# Patient Record
Sex: FEMALE | Race: WHITE | URBAN - METROPOLITAN AREA
[De-identification: names, ages, dates, MRNs, and addresses within clinical notes are randomized per-mention and may not be internally consistent; named-entity substitution may affect disease eponyms.]

---

## 2025-04-23 ENCOUNTER — OFFICE VISIT (OUTPATIENT)
Dept: PAIN MEDICINE | Facility: CLINIC | Age: 75
End: 2025-04-23
Payer: MEDICARE

## 2025-04-23 VITALS
RESPIRATION RATE: 18 BRPM | DIASTOLIC BLOOD PRESSURE: 63 MMHG | SYSTOLIC BLOOD PRESSURE: 139 MMHG | TEMPERATURE: 98.4 F | WEIGHT: 148 LBS | HEART RATE: 63 BPM | BODY MASS INDEX: 23.23 KG/M2 | OXYGEN SATURATION: 95 % | HEIGHT: 67 IN

## 2025-04-23 DIAGNOSIS — R29.898 LEFT LEG WEAKNESS: ICD-10-CM

## 2025-04-23 DIAGNOSIS — M54.42 CHRONIC LEFT-SIDED LOW BACK PAIN WITH LEFT-SIDED SCIATICA: ICD-10-CM

## 2025-04-23 DIAGNOSIS — G89.29 CHRONIC LEFT-SIDED LOW BACK PAIN WITH LEFT-SIDED SCIATICA: ICD-10-CM

## 2025-04-23 DIAGNOSIS — M54.16 LUMBAR NEURITIS: Primary | ICD-10-CM

## 2025-04-23 PROCEDURE — 99215 OFFICE O/P EST HI 40 MIN: CPT | Performed by: ANESTHESIOLOGY

## 2025-04-23 RX ORDER — PAROXETINE HYDROCHLORIDE 20 MG/1
20 TABLET, FILM COATED ORAL EVERY MORNING
COMMUNITY

## 2025-04-23 RX ORDER — RALOXIFENE HYDROCHLORIDE 60 MG/1
60 TABLET, FILM COATED ORAL DAILY
COMMUNITY

## 2025-04-23 RX ORDER — GABAPENTIN 300 MG/1
300 CAPSULE ORAL NIGHTLY
Qty: 30 CAPSULE | Refills: 11 | Status: SHIPPED | OUTPATIENT
Start: 2025-04-23 | End: 2026-04-18

## 2025-04-23 SDOH — ECONOMIC STABILITY: FOOD INSECURITY: WITHIN THE PAST 12 MONTHS, THE FOOD YOU BOUGHT JUST DIDN'T LAST AND YOU DIDN'T HAVE MONEY TO GET MORE.: NEVER TRUE

## 2025-04-23 SDOH — ECONOMIC STABILITY: FOOD INSECURITY: WITHIN THE PAST 12 MONTHS, YOU WORRIED THAT YOUR FOOD WOULD RUN OUT BEFORE YOU GOT MONEY TO BUY MORE.: NEVER TRUE

## 2025-04-23 ASSESSMENT — PAIN DESCRIPTION - DESCRIPTORS: DESCRIPTORS: BURNING;DULL;THROBBING

## 2025-04-23 ASSESSMENT — PATIENT HEALTH QUESTIONNAIRE - PHQ9
2. FEELING DOWN, DEPRESSED OR HOPELESS: NOT AT ALL
1. LITTLE INTEREST OR PLEASURE IN DOING THINGS: NOT AT ALL
SUM OF ALL RESPONSES TO PHQ9 QUESTIONS 1 & 2: 0

## 2025-04-23 ASSESSMENT — PAIN - FUNCTIONAL ASSESSMENT: PAIN_FUNCTIONAL_ASSESSMENT: 0-10

## 2025-04-23 ASSESSMENT — ENCOUNTER SYMPTOMS
OCCASIONAL FEELINGS OF UNSTEADINESS: 1
LOSS OF SENSATION IN FEET: 1

## 2025-04-23 ASSESSMENT — COLUMBIA-SUICIDE SEVERITY RATING SCALE - C-SSRS
1. IN THE PAST MONTH, HAVE YOU WISHED YOU WERE DEAD OR WISHED YOU COULD GO TO SLEEP AND NOT WAKE UP?: NO
2. HAVE YOU ACTUALLY HAD ANY THOUGHTS OF KILLING YOURSELF?: NO
6. HAVE YOU EVER DONE ANYTHING, STARTED TO DO ANYTHING, OR PREPARED TO DO ANYTHING TO END YOUR LIFE?: NO

## 2025-04-23 ASSESSMENT — LIFESTYLE VARIABLES
SKIP TO QUESTIONS 9-10: 1
HOW OFTEN DO YOU HAVE A DRINK CONTAINING ALCOHOL: MONTHLY OR LESS
AUDIT-C TOTAL SCORE: 1
HOW OFTEN DO YOU HAVE SIX OR MORE DRINKS ON ONE OCCASION: NEVER
TOTAL SCORE: 1
HOW MANY STANDARD DRINKS CONTAINING ALCOHOL DO YOU HAVE ON A TYPICAL DAY: 1 OR 2

## 2025-04-23 ASSESSMENT — PAIN SCALES - GENERAL
PAINLEVEL_OUTOF10: 6
PAINLEVEL_OUTOF10: 6

## 2025-04-23 NOTE — PROGRESS NOTES
History Of Present Illness  Michell Mcneil is a 74 y.o. female presenting with   Chief Complaint   Patient presents with   • New Patient Visit   • Pain       Patient presents with complaints of chronic low back pain for 2 years that has gotten progressively worse. Her left foot feels like its on fire.     The pain is constant, worse with bending and better with rest. The pain is sharp, stabbing and shooting to the B/L LE. Denies LE paresthesias, weakness, saddle anesthesia, bowel or bladder incontinence. To manage this pain the patient has attempted Tylenol, Ibuprofen, NSAIDS. The patients chronic Panic disorder are stable on medication management.     PAIN SCORE: 7/10.    PCP; Dr. Padilla        Past Medical History  She has no past medical history on file.    Surgical History  She has a past surgical history that includes Ovarian cyst removal; Knee cartilage surgery (Left); and Varicose vein surgery (Right).     Social History  She reports that she has never smoked. She has never used smokeless tobacco. She reports current alcohol use. She reports that she does not currently use drugs.    Family History  Family History[1]     Allergies  Patient has no known allergies.    Review of Systems    All other systems reviewed and negative for any deficits. Pertinent positives and negatives were considered in the medical decision making process.        Physical Exam  /63   Pulse 63   Temp 36.9 °C (98.4 °F)   Resp 18   Wt 67.1 kg (148 lb)   SpO2 95%     General: Pt appears stated age    Eyes: Conjunctiva non-icteric and lids without obvious rash or drooping. Pupils are symmetric.    ENT: External ears and nose appear to be without deformity or rash. No lesions or masses noted. Hearing is grossly intact.    Respiratory: No gasping or shortness of breath noted. No use of accessory muscles noted.    CVS: Extremities show no edema or varicosities    Skin: No rashes or open lesions/ulcers identified on skin. No  "induration/tightening noted with palpation of skin.     Musculoskeletal: [place] is antalgic     Stability: No subluxation noted on movement of bilateral upper extremities or head/neck.     Strength: 5/5 in RLE and 3/5 in LLE     Range of Motion: WNL    Neurologic: Reflexes 2+    Sensation: DEC TO SHARP TOUCH IN HER LLE     Neurologic: Cranial Nerves II thru XII are grossly intact    Psychiatric: Pt is alert and oriented to time, person, and place           Assessment/Plan   1. Lumbar neuritis  MR lumbar spine wo IV contrast    Referral to Physical Therapy    gabapentin (Neurontin) 300 mg capsule      2. Left leg weakness  gabapentin (Neurontin) 300 mg capsule      3. Chronic left-sided low back pain with left-sided sciatica  gabapentin (Neurontin) 300 mg capsule           1. I have provided the patient with a list of physical therapy exercises to learn and perform to strengthen core, maintain stabilization, and reduce pain. We reviewed the exercises in detail and I encouraged them to perform them on a regular basis.    2. I would recommend the pt start on Gabapentin to help with nerve related pain. We discussed the risks, benefits, and side effects to this medication including the mechanism of action and the pt understands and agrees.    3. I extensively reviewed the patients SCOLIOSIS X-RAYS (2-) findings in detail, including review of the actual images and provided a detailed explanation of the findings using a spine model.     \"There is normal alignment of the cervical spine.  There is no scoliosis.    There is no evidence of acute spinal fracture or dislocation.  There are   multilevel degenerative changes with disc space narrowing, marginal   endplate osteophytes and facet joint hypertrophy.  There are degenerative   changes the bilateral sacroiliac and hip joints and pubic symphysis.    There is no evidence of acute process in the chest or abdomen.     No other significant abnormality. \"    4. The patient " is a candidate for an LESI L5/S1 to treat low back  and radicular pain. I spent time with the patient discussing the risks, benefits, and alternatives to this measure including, but not limited to worsening pain with injection, no improvement/guarantee with the procedure, numbness in the lower extremity and risk of falls post procedure, vagal reaction/ hypotension, feeling dizzy / lightheaded, spinal infection, worsening pre-existing infections, epidural hematoma, epidural abscess, nerve injury, paralysis, spinal fluid leak and spinal headache. The patient understands all of these risks and agrees to proceed with the planned procedure.     Pending an MRI.       I spent time with the patient reviewing their imaging and discussing the risks benefits and alternatives to the above plan. A total of 60 minutes was spent reviewing the data and greater than 50% of that time was with the patient during the face to face encounter discussing treatment options both surgical, non-surgical, and minimally invasive techniques. I also reviewed records from her previous pain physician Dr. Pelletier at [facility]         Sincerely,     Michael Dumont MD, BARRERAA  Board Certified Anesthesiologist  Board Certified Pain Management Specialist  Clinical Faculty, Department of Anesthesiology and Perioperative Medicine  [facility]     [address]  [facility] [address]  [address]  [facility]   [address] [address] [id number]    [facility]  [address] [id number]     Phone: [phone]  Fax: [phone]              [1]  No family history on file.

## 2025-04-23 NOTE — PROGRESS NOTES
Pt is a new visit and is complaining of pain in her low back that radiates down her left leg to her foot. Pt states that her pain level is a 6/10 on the pain scale. Pt denies back or neck surgery. Pt states she has hip and knee pain. Pt states she has a history of arthritis.

## 2025-05-01 ENCOUNTER — APPOINTMENT (OUTPATIENT)
Dept: RADIOLOGY | Facility: CLINIC | Age: 75
End: 2025-05-01
Payer: MEDICARE

## 2025-05-12 ENCOUNTER — EVALUATION (OUTPATIENT)
Dept: PHYSICAL THERAPY | Facility: CLINIC | Age: 75
End: 2025-05-12
Payer: MEDICARE

## 2025-05-12 DIAGNOSIS — M54.16 LUMBAR NEURITIS: ICD-10-CM

## 2025-05-12 PROCEDURE — 97110 THERAPEUTIC EXERCISES: CPT | Mod: GP

## 2025-05-12 PROCEDURE — 97161 PT EVAL LOW COMPLEX 20 MIN: CPT | Mod: GP

## 2025-05-12 ASSESSMENT — PATIENT HEALTH QUESTIONNAIRE - PHQ9
1. LITTLE INTEREST OR PLEASURE IN DOING THINGS: NOT AT ALL
SUM OF ALL RESPONSES TO PHQ9 QUESTIONS 1 AND 2: 0
2. FEELING DOWN, DEPRESSED OR HOPELESS: NOT AT ALL

## 2025-05-12 ASSESSMENT — COLUMBIA-SUICIDE SEVERITY RATING SCALE - C-SSRS
6. HAVE YOU EVER DONE ANYTHING, STARTED TO DO ANYTHING, OR PREPARED TO DO ANYTHING TO END YOUR LIFE?: NO
2. HAVE YOU ACTUALLY HAD ANY THOUGHTS OF KILLING YOURSELF?: NO
1. IN THE PAST MONTH, HAVE YOU WISHED YOU WERE DEAD OR WISHED YOU COULD GO TO SLEEP AND NOT WAKE UP?: NO

## 2025-05-12 ASSESSMENT — ENCOUNTER SYMPTOMS
DEPRESSION: 0
LOSS OF SENSATION IN FEET: 0
OCCASIONAL FEELINGS OF UNSTEADINESS: 0

## 2025-05-12 NOTE — PROGRESS NOTES
Patient Name Michell Mcneil  MRN: [id number]  Today's Date: 2025  Time Calculation  Start Time: 1345  Stop Time: 1432  Time Calculation (min): 47 min    Insurance:   Visit #: 1  Insurance Reviewed  Medicare part A and B  (per information provided by  pre-cert team)   Authorization required:  N  Approved # of visits: MN  Authorization/MCR Certification date range:  25-25    Therapy Diagnoses:   Problem List Items Addressed This Visit           ICD-10-CM    Lumbar neuritis M54.16    Relevant Orders    Follow Up In Physical Therapy       General:  Reason for visit: Low back pain   Referred by: Dr. Michael Dumont MD  Next MD appt:  2024  Preferred Name:  michell  Script:  Hari and TX  Onset Date:  25  Assessment/re-assessment dates:   Email/phone #:      Subjective:  HPI: 74 year old female had back pain since 2 years, she became worse and saw MD on 25.  Complains of pain across the back and radiating nerve pain, tingling and burning on the left leg to her foot.  Patient is on Gabapentin which has reduced her pain.  Patient is having a MRI 25    Pain- 5/10 average and today, 9/10 high pain  Type of pain:  LB throbbing and aching, leg electrical pain  What increases pain:  Bending, lifting, pushing  What decreases pain:  Gabapentin    Medical Hx/  Fall Risk:  Low  Steadi:  2 yes  Precautions: OA, Osteoporosis, DJD Left hip, Osteophytosis, Mild DJD    Human Trafficking risk:  No    Patient goal:  To relieve pain, and to improve activities.  Patient is aware of diagnosis and prognosis.    Living Environment:    Social Support:  lives with:   who is a last stages of [place]  DME:   None    Prior Function:   bicyling, walked, went to the gym, gardened    Function:    A and O x 3  Sleep:  Painful  ADL's: Painful  Chores: Difficulty  Driving: No difficulty  Work: Retired   Recreation: Unable  Sittin  Standing: 10 min    Walking: Not tried more than 10  "min    Objective:    Outcome Measures:  Other Measures  Other Outcome Measures: RAVI = 17/50     Posture:  moderately for flexed posture, Lateral shift to the right, decreased hip extension and trunk rotation  Gait/Stairs: moderately decreased trunk rotation and hip extension.    Palpation:  moderate tenderness B lumbar paraspinals and piriformis.      ROM:  Trunk Flexion:  3rd to floor- 6 inches  Extension: moderately limited  RSB:  3rd to fibular head- 2 inches  LSB:  3rd to fibular head- 2 inches  RR: Moderately limited  LR: Moderately limited    MMT:  Hip flexors on the left 4-/5, right 4+/5  Abd: 4-/5 on the left, 4+/5 on the right  Bridge/LE extensors: 4-/5 on the left, WNL on the right    Flexibility:  Hamstrings:  90/90:  R: -30  L: -30  Heelcords: WFL  Hip flexors: Positive bilaterally  Gluts: B. Positive  Piriformis: B. Positive   Balance: SLS:  R: 2 seconds  L: 1 second    5 x sit-stand:  19.91  normals:  60-69 years of age: 11.4 seconds  70 - 79 years of age: 12.6 seconds   80 - 89 years of age: 14. 8 seconds    Neuro:  (at re-assessment)    Treatment:    Evaluation:  30 minutes    **= HEP progression today NV= Next visit  np= not performed  nb= non-billable  G= group HEP= discharged to HEP    Therapeutic Exercise: 12  Nu-step(subjective taken/education):    NV    Standing hams and hip flexor/calf stretches: NV    Seated flexion stretch:  NV    LTR:  x 15/5\"**  B SKTC:  10/3\"**  B supine piriformis stretch:  3/30\"**    NMR:  5  T-band 3-way pull downs:  NV  T-band obliques: NV  T-band B pull for/back:  NV    Pelvic tilt:  10/5\"**  Bridges:  NV  PT hooklying with add set:  10**  PT hooklying with t-band abd:  NV     Self Care Home Management:    minutes  Education:  poc, anatomy, physiology, posture, body mechanics, safety awareness, HEP.  Preferred learning:  pictures, demonstration.  Demonstrated good understanding.                            Assessment:    Stable and/or uncomplicated " characteristics  ELevel of complexity:  Low  Michell Mcneil demonstrates symptoms consistent with exacerbation of chronic low back pain with mobility deficits.  Symptoms include low back, thigh pain with onset of symptoms.    Patient presents with flare up of OA in the LS, signs and symptoms are consistent with diagnosis and patient is an excellent candidate for Physical Therapy and needs work on/Skilled PT for ROM, flexibility, dynamic stabilization, strength, posture, body mechanics and gait/stairs for improved overall function.         Problems:    Pain:  _X__  Posture/Body mechanics deficits:  __X_  Decreased knowledge HEP:  _X__  Decreased knowledge of Precautions:  _X__  Activity Limitations:   _X__  ADL's/IADL's/Self-care skills:  _X__  ROM/Joint Mobility:  _X__  Strength:  _X__  Decreased functional level:   _X__  Flexibility:  _X__  Tenderness/decreased soft tissue mobility:  _X__  Gait/Stairs:  _X__  Fall Risk:  ___  Balance:  _x__  Edema:  ___  Participation restrictions:  __X_  Sensory:  ___  Transfers:  ___  Decreased knowledge of brace:   ___  Other:  ___    X Indicates included in problem list      Goals:   Short Term Goals:   -Patient to be Indep with HEP for self management of symptoms      Long Term Goals:   -Modified Oswestry: 0-19% impairment to indicate a significant improvement in overall function.  -Patient will demonstrate correct posture with min to no cueing to allow for correct loading strategy   -Patient will demonstrate 5/5 hip strength to allow for correct gait and stair mechanics   -Patient will demo mild to no limitation AROM of the lumbar spine to allow for correct mechanics with functional mobility.   -Patient will report standing 45 minutes  without pain to allow for return to work and ADLs without limitation.   -Patient will report sitting 60 minutes without pain to allow for return to work and ADLs without limitation.  -Patient will demonstrate appropriate body mechanics for  "household and common activities to reduce incidence or future back pain exacerbation.      PLAN OF CARE AGREEMENT:    Plan of Care was developed with input and agreement by the patient.    Rehab potential to achieve the above goals is good.    Patient is aware of diagnosis and prognosis and agrees with established plan of care and goals.    Plan:   Skilled PT 1x/week for 12 weeks( Until goals achieved, maximum rehab potential has been achieved, or patient has plateaued)  for:  Primary intervention strategies include: manual therapy procedures to diminish pain and improve segmental spinal or lumbopelvic motion, therapeutic exercises to improve or maintain spinal mobility, and patient education that encourages the patient to return to or pursue an active lifestyle.    Aquatics  ___x__  CP   __X__  Dry needling  _x___  Education  __X__  Electrical Stimulation  __x__  Gait training  __x__  HEP  _X___  Manual  __x__  Mechanical Traction  ____  NMR  __X__  Self-care/home management  ___X_  Therapeutic Exercise   _X___  Therapeutic Activities  __X__  US  _X___  Work Conditioning  ____  Re-assessment  __X__  Other  ____    X Indicates included in treatment plan    PT for Nu-step for functional mobility and soft tissue warm up for more efficient stretching, work on   HEP:  LTR:  x 15/5\"  B SKTC:  10/3\"  B supine piriformis stretch:  3/30\"        Isometric hip adduction with a ball x 10  "

## 2025-05-20 ENCOUNTER — APPOINTMENT (OUTPATIENT)
Dept: RADIOLOGY | Facility: HOSPITAL | Age: 75
End: 2025-05-20
Payer: MEDICARE

## 2025-05-20 DIAGNOSIS — M54.16 LUMBAR NEURITIS: ICD-10-CM

## 2025-05-20 PROCEDURE — 72148 MRI LUMBAR SPINE W/O DYE: CPT

## 2025-05-20 PROCEDURE — 72148 MRI LUMBAR SPINE W/O DYE: CPT | Performed by: RADIOLOGY

## 2025-05-21 ENCOUNTER — TREATMENT (OUTPATIENT)
Dept: PHYSICAL THERAPY | Facility: CLINIC | Age: 75
End: 2025-05-21
Payer: MEDICARE

## 2025-05-21 ENCOUNTER — OFFICE VISIT (OUTPATIENT)
Dept: PAIN MEDICINE | Facility: CLINIC | Age: 75
End: 2025-05-21
Payer: MEDICARE

## 2025-05-21 VITALS
HEIGHT: 67 IN | SYSTOLIC BLOOD PRESSURE: 123 MMHG | OXYGEN SATURATION: 97 % | TEMPERATURE: 98.4 F | RESPIRATION RATE: 18 BRPM | HEART RATE: 66 BPM | WEIGHT: 148 LBS | BODY MASS INDEX: 23.23 KG/M2 | DIASTOLIC BLOOD PRESSURE: 59 MMHG

## 2025-05-21 DIAGNOSIS — M54.16 LUMBAR NEURITIS: ICD-10-CM

## 2025-05-21 DIAGNOSIS — M51.26 LUMBAR DISC HERNIATION: ICD-10-CM

## 2025-05-21 DIAGNOSIS — M47.816 LUMBAR SPONDYLOSIS: ICD-10-CM

## 2025-05-21 DIAGNOSIS — M54.16 LUMBAR NEURITIS: Primary | ICD-10-CM

## 2025-05-21 PROCEDURE — 97110 THERAPEUTIC EXERCISES: CPT | Mod: GP

## 2025-05-21 PROCEDURE — 99214 OFFICE O/P EST MOD 30 MIN: CPT | Performed by: ANESTHESIOLOGY

## 2025-05-21 RX ORDER — LOSARTAN POTASSIUM 25 MG/1
25 TABLET ORAL DAILY
COMMUNITY

## 2025-05-21 ASSESSMENT — PAIN SCALES - GENERAL
PAINLEVEL_OUTOF10: 4
PAINLEVEL_OUTOF10: 4

## 2025-05-21 ASSESSMENT — ENCOUNTER SYMPTOMS
DEPRESSION: 0
LOSS OF SENSATION IN FEET: 0
OCCASIONAL FEELINGS OF UNSTEADINESS: 1

## 2025-05-21 ASSESSMENT — PAIN DESCRIPTION - DESCRIPTORS: DESCRIPTORS: ACHING;THROBBING

## 2025-05-21 ASSESSMENT — PAIN - FUNCTIONAL ASSESSMENT: PAIN_FUNCTIONAL_ASSESSMENT: 0-10

## 2025-05-21 NOTE — PROGRESS NOTES
Pt is a follow up and is complaining of pain in her low back without radiation. Pt states her pain level is a 4/10 on the pain scale. Pt states that gabapentin has resolved her leg pain. Pt denies back or neck surgery. Pt states her hip pain is resolving. Pt is here for her MRI results.

## 2025-05-21 NOTE — PROGRESS NOTES
"Physical Therapy  Physical Therapy Progress Note    Patient Name Michell Mcneil   MRN: [id number]  Today's Date: 5/21/2025  Time Calculation  Start Time: 0400  Stop Time: 0442  Time Calculation (min): 42 min    Insurance:    Visit #:   2   Insurance Reviewed  Medicare part A and B  (per information provided by  pre-cert team)   Authorization required:  N  Approved # of visits: MN  Authorization/MCR Certification date range:  5/12/25-8/8/25    Therapy Diagnoses:   Problem List Items Addressed This Visit           ICD-10-CM    Lumbar neuritis M54.16     General:  Reason for visit: Low back pain   Referred by: Dr. Michael Dumont MD  Next MD appt:  5/21/2024  Preferred Name:  michell  Script:  [place] and TX  Onset Date:  4/23/25  Assessment/re-assessment dates:   Email/phone #:      Subjective:   Patient reports:  MRI results came back revealing a herniated disc at L4-5    Have you fallen since last visit:  no    Medical Hx/  Fall Risk:  Low  Steadi:  2 yes  Precautions: OA, Osteoporosis, DJD Left hip, Osteophytosis, Mild DJD    Pain- 4/10 average for LB  Type of pain:  LB aching. No LE symptoms now since she has been on Gabapentin  What increases pain:  Bending, lifting, pushing    HEP compliance/understanding:  good    Objective:   Objective Measurements:    Function:     Posture: VC's to correct posture to avoid valgus knee alignment forward flexed spine for postural stabilization ex  Gait:  Balance:   ROM:    Strength:  Flexibility:      Treatment:   **= HEP progression today NV= Next visit  np= not performed  nb= non-billable  G= group HEP= discharged to HEP  Therapeutic Exercise:     42 minutes  Standing HS/HF stretch, 30\" x 2, R/L  Mid Rows with core activation, Blue TB, 2 x 10  Dangelo shoulder ext with core activation, green, 2 x 10   Diagonal chops, green TB, 2 x 10  Green PB DKC, 5\" , 10x  Green PB, LTR, 10\" x 10 R/L  Supine bridge, 5\", 10x  Supine isometric hip flex, 5\", 10x R/L  Supine DLS SLR, 5\", 10x R/L  3 " "way PB seated stretches, 5\", 10x R/L    Education:  Postural education    Assessment:  Patient tolerated treatment well, did well with progression of ex without exacerbating her LE symptoms this date.    Patient needs continued work on/skilled PT for: further postural stabilization and core strengthening to address remaining functional, objective and subjective deficits to allow them to return to prior /optimal level of function with ADLs.  Patient is progressing with goals: yes  Skilled care:  Ex guidance and postural correction    Plan:    Continue to progress per poc:   NV add horizontal chops    HEP:    LTR:  x 15/5\"  B SKTC:  10/3\"  B supine piriformis stretch:  3/30\"        Isometric hip adduction with a ball x 10    Access Code: [email or web address]  URL: [email or web address]  Date: 05/21/2025  Prepared by: Valerie Dickson    Exercises  - Supine Bridge  - 1 x daily - 7 x weekly - 1-2 sets - 10 reps - 5 seconds hold  - Small Range Straight Leg Raise  - 1 x daily - 7 x weekly - 1 sets - 10 reps - 5 seconds hold  - Hooklying Isometric Hip Flexion  - 1-2 x daily - 7 x weekly - 1 sets - 10 reps - 5 count hold  "

## 2025-05-21 NOTE — H&P (VIEW-ONLY)
History Of Present Illness  Michell Mcneil is a 74 y.o. female presenting with   Chief Complaint   Patient presents with    Back Pain    Follow-up       Patient follows up for her chronic low back pain for 2 years that has gotten progressively worse. Her left foot feels like its on fire.     The pain is constant, worse with bending and better with rest. The pain is sharp, stabbing and shooting to the B/L LE. Denies LE paresthesias, weakness, saddle anesthesia, bowel or bladder incontinence. To manage this pain the patient has attempted Tylenol, Ibuprofen, NSAIDS. The patients chronic Panic disorder are stable on medication management.     PAIN SCORE: 7/10.    PCP; Dr. Padilla      Past Medical History  She has no past medical history on file.    Surgical History  She has a past surgical history that includes Ovarian cyst removal; Knee cartilage surgery (Left); and Varicose vein surgery (Right).     Social History  She reports that she has never smoked. She has never used smokeless tobacco. She reports current alcohol use. She reports that she does not currently use drugs.    Family History  Family History[1]     Allergies  Patient has no known allergies.    Review of Systems    All other systems reviewed and negative for any deficits. Pertinent positives and negatives were considered in the medical decision making process.        Physical Exam  /59   Pulse 66   Temp 36.9 °C (98.4 °F)   Resp 18   Wt 67.1 kg (148 lb)   SpO2 97%     General: Pt appears stated age    Eyes: Conjunctiva non-icteric and lids without obvious rash or drooping. Pupils are symmetric.    ENT: External ears and nose appear to be without deformity or rash. No lesions or masses noted. Hearing is grossly intact.    Respiratory: No gasping or shortness of breath noted. No use of accessory muscles noted.    CVS: Extremities show no edema or varicosities    Skin: No rashes or open lesions/ulcers identified on skin. No  "induration/tightening noted with palpation of skin.     Musculoskeletal: [place] is antalgic     Stability: No subluxation noted on movement of bilateral upper extremities or head/neck.     Strength: 5/5 in RLE and 3/5 in LLE     Range of Motion: WNL    Neurologic: Reflexes 2+    Sensation: DEC TO SHARP TOUCH IN HER LLE     Neurologic: Cranial Nerves II thru XII are grossly intact    Psychiatric: Pt is alert and oriented to time, person, and place           Assessment/Plan   1. Lumbar neuritis        2. Lumbar disc herniation        3. Lumbar spondylosis               1. I have provided the patient with a list of physical therapy exercises to learn and perform to strengthen core, maintain stabilization, and reduce pain. We reviewed the exercises in detail and I encouraged them to perform them on a regular basis.    2. I would recommend the pt CONTINUE on Gabapentin 300mg once a day to help with nerve related pain. We discussed the risks, benefits, and side effects to this medication including the mechanism of action and the pt understands and agrees.    She started this medication and reports her left leg radicular pain has improved significantly!    3. I extensively reviewed the patients SCOLIOSIS X-RAYS (2-) findings in detail, including review of the actual images and provided a detailed explanation of the findings using a spine model.     \"There is normal alignment of the cervical spine.  There is no scoliosis.    There is no evidence of acute spinal fracture or dislocation.  There are   multilevel degenerative changes with disc space narrowing, marginal   endplate osteophytes and facet joint hypertrophy.  There are degenerative   changes the bilateral sacroiliac and hip joints and pubic symphysis.    There is no evidence of acute process in the chest or abdomen.     No other significant abnormality. \"    4. The patient is a candidate for an LESI L5/S1 to treat low back  and radicular pain. I spent time with " the patient discussing the risks, benefits, and alternatives to this measure including, but not limited to worsening pain with injection, no improvement/guarantee with the procedure, numbness in the lower extremity and risk of falls post procedure, vagal reaction/ hypotension, feeling dizzy / lightheaded, spinal infection, worsening pre-existing infections, epidural hematoma, epidural abscess, nerve injury, paralysis, spinal fluid leak and spinal headache. The patient understands all of these risks and agrees to proceed with the planned procedure.     5. I extensively reviewed the patients MRI findings in detail, including review of the actual images and provided a detailed explanation of the findings using a spine model.     There is noted a disc herniation at the L4/5 level.       I spent time with the patient reviewing their imaging and discussing the risks benefits and alternatives to the above plan. A total of 30 minutes was spent reviewing the data and greater than 50% of that time was with the patient during the face to face encounter discussing treatment options both surgical, non-surgical, and minimally invasive techniques. I also reviewed records from her previous pain physician Dr. Pelletier at [facility]       Sincerely,       Michale Dumont MD, FASA  Board Certified Anesthesiologist  Board Certified Pain Management Specialist  Clinical Faculty, Department of Anesthesiology and Perioperative Medicine  [facility]     [address]  [employer] [address] [facility]   [address] [id number]    [facility]  [address] [id number]     Phone: [phone]  Fax: [phone]            [1] No family history on file.

## 2025-05-21 NOTE — PROGRESS NOTES
History Of Present Illness  Michell Mcneil is a 74 y.o. female presenting with   Chief Complaint   Patient presents with    Back Pain    Follow-up       Patient follows up for her chronic low back pain for 2 years that has gotten progressively worse. Her left foot feels like its on fire.     The pain is constant, worse with bending and better with rest. The pain is sharp, stabbing and shooting to the B/L LE. Denies LE paresthesias, weakness, saddle anesthesia, bowel or bladder incontinence. To manage this pain the patient has attempted Tylenol, Ibuprofen, NSAIDS. The patients chronic Panic disorder are stable on medication management.     PAIN SCORE: 7/10.    PCP; Dr. Padilla      Past Medical History  She has no past medical history on file.    Surgical History  She has a past surgical history that includes Ovarian cyst removal; Knee cartilage surgery (Left); and Varicose vein surgery (Right).     Social History  She reports that she has never smoked. She has never used smokeless tobacco. She reports current alcohol use. She reports that she does not currently use drugs.    Family History  Family History[1]     Allergies  Patient has no known allergies.    Review of Systems    All other systems reviewed and negative for any deficits. Pertinent positives and negatives were considered in the medical decision making process.        Physical Exam  /59   Pulse 66   Temp 36.9 °C (98.4 °F)   Resp 18   Wt 67.1 kg (148 lb)   SpO2 97%     General: Pt appears stated age    Eyes: Conjunctiva non-icteric and lids without obvious rash or drooping. Pupils are symmetric.    ENT: External ears and nose appear to be without deformity or rash. No lesions or masses noted. Hearing is grossly intact.    Respiratory: No gasping or shortness of breath noted. No use of accessory muscles noted.    CVS: Extremities show no edema or varicosities    Skin: No rashes or open lesions/ulcers identified on skin. No  "induration/tightening noted with palpation of skin.     Musculoskeletal: [place] is antalgic     Stability: No subluxation noted on movement of bilateral upper extremities or head/neck.     Strength: 5/5 in RLE and 3/5 in LLE     Range of Motion: WNL    Neurologic: Reflexes 2+    Sensation: DEC TO SHARP TOUCH IN HER LLE     Neurologic: Cranial Nerves II thru XII are grossly intact    Psychiatric: Pt is alert and oriented to time, person, and place           Assessment/Plan   1. Lumbar neuritis        2. Lumbar disc herniation        3. Lumbar spondylosis               1. I have provided the patient with a list of physical therapy exercises to learn and perform to strengthen core, maintain stabilization, and reduce pain. We reviewed the exercises in detail and I encouraged them to perform them on a regular basis.    2. I would recommend the pt CONTINUE on Gabapentin 300mg once a day to help with nerve related pain. We discussed the risks, benefits, and side effects to this medication including the mechanism of action and the pt understands and agrees.    She started this medication and reports her left leg radicular pain has improved significantly!    3. I extensively reviewed the patients SCOLIOSIS X-RAYS (2-) findings in detail, including review of the actual images and provided a detailed explanation of the findings using a spine model.     \"There is normal alignment of the cervical spine.  There is no scoliosis.    There is no evidence of acute spinal fracture or dislocation.  There are   multilevel degenerative changes with disc space narrowing, marginal   endplate osteophytes and facet joint hypertrophy.  There are degenerative   changes the bilateral sacroiliac and hip joints and pubic symphysis.    There is no evidence of acute process in the chest or abdomen.     No other significant abnormality. \"    4. The patient is a candidate for an LESI L5/S1 to treat low back  and radicular pain. I spent time with " the patient discussing the risks, benefits, and alternatives to this measure including, but not limited to worsening pain with injection, no improvement/guarantee with the procedure, numbness in the lower extremity and risk of falls post procedure, vagal reaction/ hypotension, feeling dizzy / lightheaded, spinal infection, worsening pre-existing infections, epidural hematoma, epidural abscess, nerve injury, paralysis, spinal fluid leak and spinal headache. The patient understands all of these risks and agrees to proceed with the planned procedure.     5. I extensively reviewed the patients MRI findings in detail, including review of the actual images and provided a detailed explanation of the findings using a spine model.     There is noted a disc herniation at the L4/5 level.       I spent time with the patient reviewing their imaging and discussing the risks benefits and alternatives to the above plan. A total of 30 minutes was spent reviewing the data and greater than 50% of that time was with the patient during the face to face encounter discussing treatment options both surgical, non-surgical, and minimally invasive techniques. I also reviewed records from her previous pain physician Dr. Pelletier at [facility]       Sincerely,       Michael Dumont MD, FASA  Board Certified Anesthesiologist  Board Certified Pain Management Specialist  Clinical Faculty, Department of Anesthesiology and Perioperative Medicine  [employer] [facility] [employer]     [address]  [facility] [address] [facility]   [address] [id number]    [facility]  [address] [id number]     Phone: [phone]  Fax: [phone]            [1] No family history on file.

## 2025-05-28 ENCOUNTER — TREATMENT (OUTPATIENT)
Dept: PHYSICAL THERAPY | Facility: CLINIC | Age: 75
End: 2025-05-28
Payer: MEDICARE

## 2025-05-28 DIAGNOSIS — M54.16 LUMBAR NEURITIS: ICD-10-CM

## 2025-05-28 PROCEDURE — 97110 THERAPEUTIC EXERCISES: CPT | Mod: GP,CQ

## 2025-05-28 NOTE — PROGRESS NOTES
"Physical Therapy  Physical Therapy Progress Note    Patient Name Michell Mcneil   MRN: [id number]  Today's Date: 5/28/2025  Time Calculation  Start Time: 0400  Stop Time: 0440  Time Calculation (min): 40 min    Insurance:    Visit #:   3   Insurance Reviewed  Medicare part A and B  (per information provided by  pre-cert team)   Authorization required:  N  Approved # of visits: MN  Authorization/MCR Certification date range:  5/12/25-8/8/25    Therapy Diagnoses:   Problem List Items Addressed This Visit           ICD-10-CM    Lumbar neuritis M54.16     General:  Reason for visit: Low back pain   Referred by: Dr. Michael Dumont MD  Next MD appt:  5/21/2024  Preferred Name:  michell  Script:  [place]ari and [place]  Onset Date:  4/23/25  Assessment/re-assessment dates:   Email/phone #:      Subjective:   Patient reports: HEP going ok, believes the therapy is helping her and since starting the Gabapentin the nerve pain is gone. No longer has any time  limitations on walking, feels she can walk pretty good now . Has been able to garden without issues. Sleeping no longer a problem.    Have you fallen since last visit:  no    Medical Hx/  Fall Risk:  Low  Steadi:  2 yes  Precautions: OA, Osteoporosis, DJD Left hip, Osteophytosis, Mild DJD    Pain- 3/10 average for LB  Type of pain:  LB aching. No LE symptoms now since she has been on Gabapentin  What increases pain:  Bending, lifting, pushing    HEP compliance/understanding:  good    Objective:   Objective Measurements:    Function:   walking improved ( no time limitations). Able to garden. Able to sleep.  Posture: VC's to correct posture to avoid valgus knee alignment forward flexed spine for postural stabilization ex  Gait:  Balance:   ROM:    Strength:  Flexibility:      Treatment:   **= HEP progression today NV= Next visit  np= not performed  nb= non-billable  G= group HEP= discharged to [facility]  Therapeutic Exercise:     40 minutes  Nu step L3 5'  Standing HS/HF stretch, 30\" x " "2, R/L  Mid Rows with core activation, Blue TB, 2 x 10 **  Dangelo shoulder ext /flex with core activation, green, 2 x 10 **  horizontal chops green 10x   Diagonal chops, green TB, 2 x 10  Green PB DKC, 5\" , 10x  Green PB, LTR, 10\" x 10 R/L  Supine bridge, 5\", 10x  Supine isometric hip flex, 5\", 10x R/L  Supine DLS SLR, 5\", 10x R/L  3 way PB seated stretches, 5\", 10x R/L    Education:  Postural education    Assessment:  Patient tolerated treatment well, did well with progression of ex and updated HEP with tband exercises.  Overall function improved , pt. Pleased with her progress.  Patient needs continued work on/skilled PT for: further postural stabilization and core strengthening to address remaining functional, objective and subjective deficits to allow them to return to prior /optimal level of function with ADLs.  Patient is progressing with goals: decreased pain/compliant with HEP/improved function  Skilled care:  Ex guidance and postural correction    Plan:    Continue to progress per poc:   NV attempt shuttle     HEP:      5/28/25  Tband rows  blue 20x   Tband pull back /pull forward green 15x          LTR:  x 15/5\"  B SKTC:  10/3\"  B supine piriformis stretch:  3/30\"        Isometric hip adduction with a ball x 10    Access Code: [email or web address]  URL: [email or web address]  Date: 05/21/2025  Prepared by: Valerie Dickson    Exercises  - Supine Bridge  - 1 x daily - 7 x weekly - 1-2 sets - 10 reps - 5 seconds hold  - Small Range Straight Leg Raise  - 1 x daily - 7 x weekly - 1 sets - 10 reps - 5 seconds hold  - Hooklying Isometric Hip Flexion  - 1-2 x daily - 7 x weekly - 1 sets - 10 reps - 5 count hold  "

## 2025-06-04 ENCOUNTER — TREATMENT (OUTPATIENT)
Dept: PHYSICAL THERAPY | Facility: CLINIC | Age: 75
End: 2025-06-04
Payer: MEDICARE

## 2025-06-04 DIAGNOSIS — M54.16 LUMBAR NEURITIS: ICD-10-CM

## 2025-06-04 NOTE — PROGRESS NOTES
Physical Therapy  Physical Therapy Progress Note    Patient Name Michell Mcneil   MRN: [id number]  Today's Date: 6/4/2025       Insurance:    Visit #:   4   Insurance Reviewed  Medicare part A and B  (per information provided by  pre-cert team)   Authorization required:  N  Approved # of visits: MN  Authorization/MCR Certification date range:  5/12/25-8/8/25    Therapy Diagnoses:   Problem List Items Addressed This Visit           ICD-10-CM    Lumbar neuritis M54.16     General:  Reason for visit: Low back pain   Referred by: Dr. Michael Dumont MD  Next MD appt:  5/21/2024  Preferred Name:  michell  Script:  [place] and TX  Onset Date:  4/23/25  Assessment/re-assessment dates:   Email/phone #:      Subjective:   Patient reports: the LB back is always there, it goes as low as a 2, it increases with bending. Getting injection in a week and feels she has improved a lot. Her  starts radiation next week for his brain cancer. Reports she is making today her last day .    Have you fallen since last visit:  no    Medical Hx/  Fall Risk:  Low  Steadi:  2 yes  Precautions: OA, Osteoporosis, DJD Left hip, Osteophytosis, Mild DJD    Pain- 3/10 average for LB  Type of pain:  LB aching. No LE symptoms now since she has been on Gabapentin  What increases pain:  Bending     HEP compliance/understanding:  good    Objective:   Objective Measurements:      [place]sw[place]  RAVI   3/50  Function:   walking improved ( no time limitations). Able to garden. Able to sleep. Reports good mechanics at home with all she needs to do.  Posture: VC's to correct posture to avoid valgus knee alignment forward flexed spine for postural stabilization ex  Gait:  Balance:   ROM:  Trunk Flexion  3rd to floor 2 inches   Back extension WFL   RR /LR  WNL   Strength: L hip flex 4+  L hip abduction 4   Flexibility:  R/L    -25    Treatment:   **= HEP progression today NV= Next visit  np= not performed  nb= non-billable  G= group HEP= discharged to  "HEP  Therapeutic Exercise:       minutes  Nu step L3 5'  Standing HS/HF stretch, 30\" x 2, R/L  Mid Rows with core activation, Blue TB, 2 x 10 **  Dangelo shoulder ext /flex with core activation, green, 2 x 10 **  horizontal chops green 10x   Diagonal chops, green TB, 2 x 10  Green PB DKC, 5\" , 10x  Green PB, LTR, 10\" x 10 R/L  Supine bridge, 5\", 10x  Supine dangelo isometric hip flex, 5\", 10x   Supine DLS SLR, 5\", 10x R/L 1#  3 way PB seated stretches, 5\", 10x R/L    Education:  Postural education    Assessment:  Patient tolerated treatment well, reviewed complete HEP and instructed in appropriate progressions for them.    Patient has progressed with goals: decreased pain/compliant with HEP/improved function/ improved ROM and strength / improved RAVI   Pt. Has made excellent progress towards all goals . Feels comfortable and confident with HEP. Is choosing discharge at this time due to her progress and home situation.  Skilled care:  Ex guidance and postural correction    Plan:    Discharge per pt. request  with HEP.    HEP:      5/28/25  Tband rows  blue 20x   Tband pull back /pull forward green 15x          LTR:  x 15/5\"  B SKTC:  10/3\"  B supine piriformis stretch:  3/30\"        Isometric hip adduction with a ball x 10    Access Code: [email or web address]  URL: [email or web address]  Date: 05/21/2025  Prepared by: Valerie Dickson    Exercises  - Supine Bridge  - 1 x daily - 7 x weekly - 1-2 sets - 10 reps - 5 seconds hold  - Small Range Straight Leg Raise  - 1 x daily - 7 x weekly - 1 sets - 10 reps - 5 seconds hold  - Hooklying Isometric Hip Flexion  - 1-2 x daily - 7 x weekly - 1 sets - 10 reps - 5 count hold  "

## 2025-06-11 ENCOUNTER — APPOINTMENT (OUTPATIENT)
Dept: PHYSICAL THERAPY | Facility: CLINIC | Age: 75
End: 2025-06-11
Payer: MEDICARE

## 2025-06-17 ENCOUNTER — HOSPITAL ENCOUNTER (OUTPATIENT)
Dept: PAIN MEDICINE | Facility: CLINIC | Age: 75
Discharge: HOME | End: 2025-06-17
Payer: MEDICARE

## 2025-06-17 VITALS
HEART RATE: 61 BPM | OXYGEN SATURATION: 96 % | RESPIRATION RATE: 18 BRPM | SYSTOLIC BLOOD PRESSURE: 150 MMHG | TEMPERATURE: 98.6 F | DIASTOLIC BLOOD PRESSURE: 72 MMHG

## 2025-06-17 DIAGNOSIS — M54.16 LUMBAR NEURITIS: ICD-10-CM

## 2025-06-17 PROCEDURE — 62323 NJX INTERLAMINAR LMBR/SAC: CPT | Performed by: ANESTHESIOLOGY

## 2025-06-17 PROCEDURE — 2500000005 HC RX 250 GENERAL PHARMACY W/O HCPCS: Performed by: ANESTHESIOLOGY

## 2025-06-17 PROCEDURE — 2500000004 HC RX 250 GENERAL PHARMACY W/ HCPCS (ALT 636 FOR OP/ED): Performed by: ANESTHESIOLOGY

## 2025-06-17 RX ORDER — TRIAMCINOLONE ACETONIDE 40 MG/ML
INJECTION, SUSPENSION INTRA-ARTICULAR; INTRAMUSCULAR AS NEEDED
Status: COMPLETED | OUTPATIENT
Start: 2025-06-17 | End: 2025-06-17

## 2025-06-17 RX ORDER — LIDOCAINE HYDROCHLORIDE 5 MG/ML
INJECTION, SOLUTION INFILTRATION; INTRAVENOUS AS NEEDED
Status: COMPLETED | OUTPATIENT
Start: 2025-06-17 | End: 2025-06-17

## 2025-06-17 RX ORDER — SODIUM CHLORIDE 9 MG/ML
INJECTION, SOLUTION INTRAMUSCULAR; INTRAVENOUS; SUBCUTANEOUS AS NEEDED
Status: COMPLETED | OUTPATIENT
Start: 2025-06-17 | End: 2025-06-17

## 2025-06-17 RX ADMIN — SODIUM CHLORIDE 10 ML: 9 INJECTION, SOLUTION INTRAMUSCULAR; INTRAVENOUS; SUBCUTANEOUS at 09:05

## 2025-06-17 RX ADMIN — LIDOCAINE HYDROCHLORIDE 10 ML: 5 INJECTION, SOLUTION INFILTRATION at 09:04

## 2025-06-17 RX ADMIN — TRIAMCINOLONE ACETONIDE 40 MG: 40 INJECTION, SUSPENSION INTRA-ARTICULAR; INTRAMUSCULAR at 09:05

## 2025-06-17 ASSESSMENT — PAIN - FUNCTIONAL ASSESSMENT
PAIN_FUNCTIONAL_ASSESSMENT: 0-10
PAIN_FUNCTIONAL_ASSESSMENT: 0-10

## 2025-06-17 ASSESSMENT — PAIN DESCRIPTION - DESCRIPTORS: DESCRIPTORS: ACHING

## 2025-06-17 ASSESSMENT — PAIN SCALES - GENERAL
PAINLEVEL_OUTOF10: 3
PAINLEVEL_OUTOF10: 0 - NO PAIN

## 2025-06-18 ENCOUNTER — APPOINTMENT (OUTPATIENT)
Dept: PHYSICAL THERAPY | Facility: CLINIC | Age: 75
End: 2025-06-18
Payer: MEDICARE

## 2025-07-21 ENCOUNTER — HOSPITAL ENCOUNTER (EMERGENCY)
Facility: HOSPITAL | Age: 75
Discharge: HOME | End: 2025-07-21
Attending: EMERGENCY MEDICINE
Payer: MEDICARE

## 2025-07-21 ENCOUNTER — APPOINTMENT (OUTPATIENT)
Dept: RADIOLOGY | Facility: HOSPITAL | Age: 75
End: 2025-07-21
Payer: MEDICARE

## 2025-07-21 VITALS
HEART RATE: 73 BPM | OXYGEN SATURATION: 97 % | RESPIRATION RATE: 18 BRPM | WEIGHT: 145 LBS | HEIGHT: 67 IN | BODY MASS INDEX: 22.76 KG/M2 | TEMPERATURE: 97.7 F

## 2025-07-21 DIAGNOSIS — S09.90XA INJURY OF HEAD, INITIAL ENCOUNTER: Primary | ICD-10-CM

## 2025-07-21 PROCEDURE — 70450 CT HEAD/BRAIN W/O DYE: CPT

## 2025-07-21 PROCEDURE — 70450 CT HEAD/BRAIN W/O DYE: CPT | Performed by: RADIOLOGY

## 2025-07-21 PROCEDURE — 2500000001 HC RX 250 WO HCPCS SELF ADMINISTERED DRUGS (ALT 637 FOR MEDICARE OP): Performed by: EMERGENCY MEDICINE

## 2025-07-21 PROCEDURE — 99284 EMERGENCY DEPT VISIT MOD MDM: CPT | Mod: 25 | Performed by: EMERGENCY MEDICINE

## 2025-07-21 RX ORDER — ACETAMINOPHEN 325 MG/1
975 TABLET ORAL ONCE
Status: COMPLETED | OUTPATIENT
Start: 2025-07-21 | End: 2025-07-21

## 2025-07-21 RX ADMIN — ACETAMINOPHEN 975 MG: 325 TABLET ORAL at 12:31

## 2025-07-21 NOTE — ED PROVIDER NOTES
HPI   Chief Complaint   Patient presents with    Head Injury       HPI: 74-year-old female states that yesterday she was working in the yard and she hit her head on a branch.  No LOC.  No blood thinners.  She states that she just wanted to get it checked out.  She presented ambulatory.  She drove herself here.  No nausea vomiting or diarrhea.  No chest pain.  No other injuries    Family HX: Denies any significant/pertinent family history.  Social Hx: Denies ETOH or drug use.  Review of systems:  Gen.: No weight loss, fatigue, anorexia, insomnia, fever.   Eyes: No vision loss, double vision, drainage, eye pain.   ENT: No pharyngitis, dry mouth.   Cardiac: No chest pain, palpitations, syncope, near syncope.   Pulmonary: No shortness of breath, cough, hemoptysis.   Heme/lymph: No swollen glands, fever, bleeding.   GI: No abdominal pain, change in bowel habits, melena, hematemesis, hematochezia, nausea, vomiting, diarrhea.   : No discharge, dysuria, frequency, urgency, hematuria.   Musculoskeletal: No limb pain, joint pain, joint swelling.   Skin: No rashes.   Psych: No depression, anxiety, suicidality, homicidality.   Review of systems is otherwise negative unless stated above or in history of present illness.    Physical Exam:    Appearance: Alert, oriented , cooperative,  in no acute distress. Well nourished & well hydrated.    Skin: Intact,  dry skin, no lesions, rash, petechiae or purpura.     Eyes: PERRLA, EOMs intact,  Conjunctiva pink with no redness or exudates. Eyelids without lesions. No scleral icterus.     ENT: Hearing grossly intact. External auditory canals patent, tympanic membranes intact with visible landmarks. Nares patent, mucus membranes moist. Dentition without lesions. Pharynx clear, uvula midline.     Neck: Supple, without meningismus. Thyroid not palpable. Trachea at midline. No lymphadenopathy.    Pulmonary: Clear bilaterally with good chest wall excursion. No rales, rhonchi or wheezing. No  accessory muscle use or stridor.    Cardiac: Normal S1, S2 without murmur, rub, gallop or extrasystole. No JVD, Carotids without bruits.    Abdomen: Soft, nontender, active bowel sounds.  No palpable organomegaly.  No rebound or guarding.  No CVA tenderness.    Genitourinary: Exam deferred.    Musculoskeletal: Full range of motion. no pain, edema, or deformity. Pulses full and equal. No cyanosis, clubbing, or edema.    Neurological:  Cranial nerves II through XII are grossly intact, finger-nose touch is normal, normal sensation, no weakness, no focal findings identified.    Psychiatric: Appropriate mood and affect.     Medical Decision-Making:  Testing: CT of the head to rule out signs of intracranial hemorrhage or skull fracture.  It was read by radiology as negative.  Patient was given Tylenol.  At this time she will be discharged home.  She is neurovascular intact  Treatment:   Reevaluation:   Plan: Homegoing. Discussed differential. Will follow-up with the primary physician in the next 2-3 days. Return if worse. They understand return precautions and discharge instructions. Patient and family/friend/caregiver are in agreement with this plan.   Impression:   1.  Head injury  2.    Labs Reviewed - No data to display     CT head wo IV contrast   Final Result    No acute intracranial hemorrhage or mass-effect.          MACRO:    None.          Signed by: Malini Engel 7/21/2025 12:53 PM    Dictation workstation:   [id number]                    Patient History   Medical History[1]  Surgical History[2]  Family History[3]  Social History[4]    Physical Exam   ED Triage Vitals [07/21/25 1004]   Temperature Heart Rate Respirations BP   36.5 °C (97.7 °F) 73 18 --      Pulse Ox Temp Source Heart Rate Source Patient Position   97 % Tympanic Monitor Sitting      BP Location FiO2 (%)     Right arm --       Physical Exam      ED Course & MDM   Diagnoses as of 07/21/25 1317   Injury of head, initial encounter                  No data recorded     Enrique Coma Scale Score: 15 (07/21/25 1005 : Tess Zambrano RN)                           Medical Decision Making      Procedure  Procedures         [1]   Past Medical History:  Diagnosis Date    Low back pain    [2]   Past Surgical History:  Procedure Laterality Date    KNEE CARTILAGE SURGERY Left     OVARIAN CYST REMOVAL      VARICOSE VEIN SURGERY Right    [3] No family history on file.  [4]   Social History  Tobacco Use    Smoking status: Never    Smokeless tobacco: Never   Substance Use Topics    Alcohol use: Yes     Comment: socially    Drug use: Not Currently        Urmila Foster MD  07/21/25 2640

## 2025-07-21 NOTE — ED TRIAGE NOTES
Pt presents to ED for head injury. Pt states last night she got hit in the left side of the head with a log. Pt denies LOC, denies thinners. Pt reports headache. Denies vision changes.